# Patient Record
Sex: MALE | Race: WHITE | Employment: FULL TIME | URBAN - METROPOLITAN AREA
[De-identification: names, ages, dates, MRNs, and addresses within clinical notes are randomized per-mention and may not be internally consistent; named-entity substitution may affect disease eponyms.]

---

## 2019-08-17 ENCOUNTER — HOSPITAL ENCOUNTER (OUTPATIENT)
Age: 54
Discharge: HOME | End: 2019-08-17
Attending: EMERGENCY MEDICINE
Payer: COMMERCIAL

## 2019-08-17 VITALS
OXYGEN SATURATION: 100 % | SYSTOLIC BLOOD PRESSURE: 116 MMHG | DIASTOLIC BLOOD PRESSURE: 74 MMHG | HEIGHT: 70 IN | BODY MASS INDEX: 26.48 KG/M2 | WEIGHT: 185 LBS | HEART RATE: 82 BPM | RESPIRATION RATE: 16 BRPM | TEMPERATURE: 98 F

## 2019-08-17 DIAGNOSIS — M25.561 RIGHT KNEE PAIN, UNSPECIFIED CHRONICITY: Primary | ICD-10-CM

## 2019-08-17 PROCEDURE — 99202 OFFICE O/P NEW SF 15 MIN: CPT

## 2019-08-17 RX ORDER — TAMSULOSIN HYDROCHLORIDE 0.4 MG/1
CAPSULE ORAL
COMMUNITY
Start: 2019-08-01

## 2019-08-17 NOTE — ED INITIAL ASSESSMENT (HPI)
Pt states last Thursday twisted right knee when play golf. Pt states felt a little sore but nothing too concerning to him. Four days later went running and since then has had right knee pain.  Pt states having pain when walking but mostly when switching dir

## 2019-08-17 NOTE — ED PROVIDER NOTES
Patient Seen in: [address]    History   Patient presents with:  Lower Extremity Injury (musculoskeletal)    Stated Complaint: RT KNEE PAIN    HPI    26-year-old male patient presents complaining of right-sided knee pain motion of the knee. No ligamentous laxity noted. No pain to the meniscus with compression and rotation. No crepitus noted.   Neuro: Normal speech, nonfocal examination  Psych: Appropriate, no agitation    ED Course   Labs Reviewed - No data to display

## 2019-08-17 NOTE — ED NOTES
Pt discharged to care of self. Pt assessed by MD. After care discussed, all questions answered. Pt confirmed understanding.

## 2021-08-06 ENCOUNTER — HOSPITAL ENCOUNTER (OUTPATIENT)
Age: 56
Discharge: HOME | End: 2021-08-06
Payer: COMMERCIAL

## 2021-08-06 ENCOUNTER — APPOINTMENT (OUTPATIENT)
Dept: GENERAL RADIOLOGY | Age: 56
End: 2021-08-06
Attending: NURSE PRACTITIONER
Payer: COMMERCIAL

## 2021-08-06 VITALS
RESPIRATION RATE: 18 BRPM | WEIGHT: 180 LBS | HEIGHT: 74 IN | HEART RATE: 76 BPM | OXYGEN SATURATION: 100 % | SYSTOLIC BLOOD PRESSURE: 139 MMHG | TEMPERATURE: 98 F | BODY MASS INDEX: 23.1 KG/M2 | DIASTOLIC BLOOD PRESSURE: 77 MMHG

## 2021-08-06 DIAGNOSIS — S46.912A SHOULDER STRAIN, LEFT, INITIAL ENCOUNTER: Primary | ICD-10-CM

## 2021-08-06 DIAGNOSIS — T07.XXXA ABRASIONS OF MULTIPLE SITES: ICD-10-CM

## 2021-08-06 PROCEDURE — 99203 OFFICE O/P NEW LOW 30 MIN: CPT | Performed by: NURSE PRACTITIONER

## 2021-08-06 PROCEDURE — 73030 X-RAY EXAM OF SHOULDER: CPT | Performed by: NURSE PRACTITIONER

## 2021-08-06 NOTE — ED INITIAL ASSESSMENT (HPI)
Per pt having left shoulder pain from fall while playing tennis, also concern for redness from abrasion after same fall.

## 2021-08-06 NOTE — ED PROVIDER NOTES
Patient Seen in: [facility]      History   Patient presents with:  Shoulder Pain    Stated Complaint: shoulder injured wants X-ray    HPI/Subjective:   Well-appearing 81-year-old male presents with complaints of left shoulder pain after fall muscle use or tachypnea. Abdomen: Soft, nontender, non-distended. Back: Normal inspection, no tenderness. Extremities: No focal swelling. Capillary refill noted. The patient's motor strength is 5/5 and symmetric in upper extremities bilaterally. Jesse Ferguson MD on 8/06/2021 at 10:29 AM           MDM   I reviewed the patient's available past medical records in [facility]. I independently reviewed the patient's left shoulder x-ray. Left shoulder shoulder x-ray is negative for acute fracture or dislocation.

## 2021-08-09 ENCOUNTER — HOSPITAL ENCOUNTER (OUTPATIENT)
Age: 56
Discharge: HOME | End: 2021-08-09
Payer: COMMERCIAL

## 2021-08-09 VITALS
OXYGEN SATURATION: 100 % | SYSTOLIC BLOOD PRESSURE: 130 MMHG | RESPIRATION RATE: 16 BRPM | HEART RATE: 69 BPM | TEMPERATURE: 97 F | DIASTOLIC BLOOD PRESSURE: 72 MMHG

## 2021-08-09 DIAGNOSIS — S61.001D AVULSION OF SKIN OF RIGHT THUMB, SUBSEQUENT ENCOUNTER: Primary | ICD-10-CM

## 2021-08-09 PROCEDURE — 99214 OFFICE O/P EST MOD 30 MIN: CPT | Performed by: NURSE PRACTITIONER

## 2021-08-09 RX ORDER — CEPHALEXIN 500 MG/1
500 CAPSULE ORAL 3 TIMES DAILY
Qty: 15 CAPSULE | Refills: 0 | Status: SHIPPED | OUTPATIENT
Start: 2021-08-09 | End: 2021-08-14
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
## 2021-08-09 NOTE — ED PROVIDER NOTES
Patient Seen in: [facility]      History   Patient presents with:  Wound Care: I have an abrasion on my thumb that does not appear to be healing well.  - Entered by patient    Stated Complaint: Wound Care - I have an abrasion on my thumb that normocephalic, atraumatic. No conjunctival injection. No facial droop or slurred speech. No oral lesions or pallor. Mucous membranes moist.    Neck: Supple. Normal ROM. Lungs: Good inspiratory effort. No accessory muscle use or tachypnea.     Abdomen: No days., Normal, Disp-15 capsule, R-0

## 2021-09-06 ENCOUNTER — HOSPITAL ENCOUNTER (OUTPATIENT)
Age: 56
Discharge: HOME | End: 2021-09-06
Payer: COMMERCIAL

## 2021-09-06 ENCOUNTER — APPOINTMENT (OUTPATIENT)
Dept: GENERAL RADIOLOGY | Age: 56
End: 2021-09-06
Attending: NURSE PRACTITIONER
Payer: COMMERCIAL

## 2021-09-06 VITALS
RESPIRATION RATE: 17 BRPM | SYSTOLIC BLOOD PRESSURE: 109 MMHG | DIASTOLIC BLOOD PRESSURE: 63 MMHG | OXYGEN SATURATION: 99 % | TEMPERATURE: 98 F | HEART RATE: 54 BPM

## 2021-09-06 DIAGNOSIS — R22.41 LOCALIZED SWELLING OF RIGHT FOOT: Primary | ICD-10-CM

## 2021-09-06 PROCEDURE — 73630 X-RAY EXAM OF FOOT: CPT | Performed by: NURSE PRACTITIONER

## 2021-09-06 PROCEDURE — 99213 OFFICE O/P EST LOW 20 MIN: CPT | Performed by: NURSE PRACTITIONER

## 2021-09-06 NOTE — ED PROVIDER NOTES
Patient Seen in: [facility]      History   Patient presents with:   Foot Pain: Foot injury - Entered by patient    Stated Complaint: Leg or Foot Injury - Foot injury    HPI/Subjective:   Well-appearing 52-year-old male presents with complaint moist.     Neck:  Supple. Normal ROM. Lungs:  Good inspiratory effort. No accessory muscle use or tachypnea. Abdomen: Soft, nontender, non-distended. Back: Normal inspection, no tenderness. Extremities: No focal swelling or tenderness.  Lillia Carlos x-ray is negative for acute fracture or dislocation. Second metatarsal appears slightly flatten. No visible soft tissue swelling. I reviewed the x-ray findings with patient.   I strongly encouraged patient to follow-up with his orthopedic surgeon as he i

## 2022-04-22 ENCOUNTER — HOSPITAL ENCOUNTER (OUTPATIENT)
Age: 57
Discharge: HOME | End: 2022-04-22
Payer: COMMERCIAL

## 2022-04-22 VITALS
DIASTOLIC BLOOD PRESSURE: 75 MMHG | SYSTOLIC BLOOD PRESSURE: 138 MMHG | HEART RATE: 88 BPM | OXYGEN SATURATION: 100 % | TEMPERATURE: 98 F | RESPIRATION RATE: 20 BRPM

## 2022-04-22 DIAGNOSIS — B02.9 HERPES ZOSTER WITHOUT COMPLICATION: Primary | ICD-10-CM

## 2022-04-22 RX ORDER — VALACYCLOVIR HYDROCHLORIDE 1 G/1
1000 TABLET, FILM COATED ORAL 3 TIMES DAILY
Qty: 21 TABLET | Refills: 0 | Status: SHIPPED | OUTPATIENT
Start: 2022-04-22 | End: 2022-04-29

## 2022-04-22 NOTE — ED INITIAL ASSESSMENT (HPI)
Pt states 2 days ago noticed some discomfort under right arm but didn't think much of it, pt states today noticed a burning rash under his right arm. Pt denies fever or NVD. Pt states received 2nd covid booster on Tuesday.

## 2022-06-29 ENCOUNTER — OFFICE VISIT (OUTPATIENT)
Dept: RHEUMATOLOGY | Facility: CLINIC | Age: 57
End: 2022-06-29
Payer: COMMERCIAL

## 2022-06-29 ENCOUNTER — LAB ENCOUNTER (OUTPATIENT)
Dept: LAB | Age: 57
End: 2022-06-29
Attending: INTERNAL MEDICINE
Payer: COMMERCIAL

## 2022-06-29 VITALS
HEIGHT: 73 IN | WEIGHT: 175 LBS | HEART RATE: 74 BPM | SYSTOLIC BLOOD PRESSURE: 132 MMHG | DIASTOLIC BLOOD PRESSURE: 79 MMHG | BODY MASS INDEX: 23.19 KG/M2

## 2022-06-29 DIAGNOSIS — Z51.81 THERAPEUTIC DRUG MONITORING: ICD-10-CM

## 2022-06-29 DIAGNOSIS — M06.09 RHEUMATOID ARTHRITIS OF MULTIPLE SITES WITH NEGATIVE RHEUMATOID FACTOR (HCC): Primary | ICD-10-CM

## 2022-06-29 DIAGNOSIS — M06.09 RHEUMATOID ARTHRITIS OF MULTIPLE SITES WITH NEGATIVE RHEUMATOID FACTOR (HCC): ICD-10-CM

## 2022-06-29 LAB
ALBUMIN SERPL-MCNC: 3.6 G/DL (ref 3.4–5)
AST SERPL-CCNC: 15 U/L (ref 15–37)
BASOPHILS # BLD AUTO: 0.05 X10(3) UL (ref 0–0.2)
BASOPHILS NFR BLD AUTO: 0.8 %
CREAT BLD-MCNC: 1.03 MG/DL
CRP SERPL-MCNC: <0.29 MG/DL (ref ?–0.3)
DEPRECATED RDW RBC AUTO: 46.6 FL (ref 35.1–46.3)
EOSINOPHIL # BLD AUTO: 0.04 X10(3) UL (ref 0–0.7)
EOSINOPHIL NFR BLD AUTO: 0.6 %
ERYTHROCYTE [DISTWIDTH] IN BLOOD BY AUTOMATED COUNT: 13.2 % (ref 11–15)
ERYTHROCYTE [SEDIMENTATION RATE] IN BLOOD: 16 MM/HR
HCT VFR BLD AUTO: 44.2 %
HGB BLD-MCNC: 14.6 G/DL
IMM GRANULOCYTES # BLD AUTO: 0.04 X10(3) UL (ref 0–1)
IMM GRANULOCYTES NFR BLD: 0.6 %
LYMPHOCYTES # BLD AUTO: 1.33 X10(3) UL (ref 1–4)
LYMPHOCYTES NFR BLD AUTO: 20.5 %
MCH RBC QN AUTO: 31.5 PG (ref 26–34)
MCHC RBC AUTO-ENTMCNC: 33 G/DL (ref 31–37)
MCV RBC AUTO: 95.5 FL
MONOCYTES # BLD AUTO: 0.32 X10(3) UL (ref 0.1–1)
MONOCYTES NFR BLD AUTO: 4.9 %
NEUTROPHILS # BLD AUTO: 4.71 X10 (3) UL (ref 1.5–7.7)
NEUTROPHILS # BLD AUTO: 4.71 X10(3) UL (ref 1.5–7.7)
NEUTROPHILS NFR BLD AUTO: 72.6 %
PLATELET # BLD AUTO: 281 10(3)UL (ref 150–450)
RBC # BLD AUTO: 4.63 X10(6)UL
WBC # BLD AUTO: 6.5 X10(3) UL (ref 4–11)

## 2022-06-29 PROCEDURE — 3075F SYST BP GE 130 - 139MM HG: CPT | Performed by: INTERNAL MEDICINE

## 2022-06-29 PROCEDURE — 84450 TRANSFERASE (AST) (SGOT): CPT

## 2022-06-29 PROCEDURE — 82040 ASSAY OF SERUM ALBUMIN: CPT

## 2022-06-29 PROCEDURE — 86140 C-REACTIVE PROTEIN: CPT

## 2022-06-29 PROCEDURE — 36415 COLL VENOUS BLD VENIPUNCTURE: CPT

## 2022-06-29 PROCEDURE — 99204 OFFICE O/P NEW MOD 45 MIN: CPT | Performed by: INTERNAL MEDICINE

## 2022-06-29 PROCEDURE — 3078F DIAST BP <80 MM HG: CPT | Performed by: INTERNAL MEDICINE

## 2022-06-29 PROCEDURE — 3008F BODY MASS INDEX DOCD: CPT | Performed by: INTERNAL MEDICINE

## 2022-06-29 PROCEDURE — 82565 ASSAY OF CREATININE: CPT

## 2022-06-29 PROCEDURE — 85025 COMPLETE CBC W/AUTO DIFF WBC: CPT

## 2022-06-29 PROCEDURE — 85652 RBC SED RATE AUTOMATED: CPT

## 2022-06-29 RX ORDER — ASPIRIN 81 MG/1
81 TABLET ORAL DAILY
COMMUNITY

## 2022-06-29 RX ORDER — PREDNISONE 1 MG/1
TABLET ORAL
Qty: 100 TABLET | Refills: 1 | Status: SHIPPED | OUTPATIENT
Start: 2022-06-29

## 2022-06-29 RX ORDER — FOLIC ACID 1 MG/1
TABLET ORAL
Qty: 90 TABLET | Refills: 3 | Status: SHIPPED | OUTPATIENT
Start: 2022-06-29

## 2022-06-29 RX ORDER — METHOTREXATE 2.5 MG/1
TABLET ORAL
Qty: 20 TABLET | Refills: 2 | Status: SHIPPED | OUTPATIENT
Start: 2022-06-29

## 2022-06-29 NOTE — PROGRESS NOTES
Dear Dr. Odilia Sanchez:    I saw your patient Mariela Contreras in consultation this afternoon at your request, regarding rheumatoid arthritis. As you know, he is a 60-year-old gentleman who 1 year ago developed pain in his right foot second MTP joint. He saw an orthopedist, and an x-ray was abnormal.  He got different shoes and  inserts. The pain and swelling went across from his second through fourth MTP joints. January of 2022, without injury, his right ankle became swollen medially and laterally, with soreness and stiffness. He saw an orthopedist who thought it was tendinitis. He worked with physical therapy for 6 weeks. He was in a boot for 4 weeks, and is now has a brace. Just a month ago, his left fifth MTP joint became swollen, stiff, and inflamed. Occasionally his thumb joints can hurt after working. Labs were done March 31st, 2022. TSH, urinalysis, B12, folate, CMP, CBC, C-reactive protein, LDH, HIV, HCV, hepatitis B surface antigen were negative. Sed rate was borderline at 22. On June 8th, 2022, CCP antibody was high at 149 (less than 4.9), and rheumatoid factor was negative. An MRI was done of his right foot May of 2022, showing new third and fourth MTP joint effusions with suspected erosion of his third and fourth metatarsal heads. Marked peroneus longus tenosynovitis and marked flexor pollicis longus tenosynovitis. Also tear of his peroneus. He sleeps fine, and his energy is fine. When he gets out of bed in the morning, his feet are not more swollen, and if anything are better. His first steps are very painful. He takes about 6 over-the-counter ibuprofen a day, which help. Past medical history:  He has BPH and is doing much better on Flomax. He fell in the past.  His CT T was done to rule out fracture, and several dots were found on his brain. 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 It was repeated later and had not changed. He has not had symptoms. He fractured his right ankle and foot in July 2014. He uses marijuana. He had a DVT after his right ankle and foot fracture. He had circumcision. Family history:  Father with coronary disease. He does not know of any autoimmune disease. Social history:  He was . 2 children. He is a manager. No cigarettes. Alcohol several times a week. Coffee every morning. He has not been able to run like he used to but is still working out [employer]. Review of systems:  No fevers, chills. Occasional night sweats. No anorexia. He did lose from 185 to 169 pounds which was felt to be from stress. He is gone back up to 175 pounds. No rash, history of psoriasis. Usual hair thinning. No internal eye inflammation, oral or nasal ulcers, of adenopathy. He rarely can get short of breath. No chest pain. No acid reflux, stomach pain, nausea or vomiting, constipation or diarrhea, blood in his stools. No trouble urinating on medicine. He can have dry mouth at night. He has water at his bedside. Occasional dry eye. No Raynaud's or headache. Physical exam:  Pleasant gentleman in no acute distress. Blood pressure 132/79, pulse 74, height 6' 1\" (1.854 m), weight 175 lb (79.4 kg). No rash. No abnormal hair thinning. No conjunctival injection. No nasal oral lesions. No cervical adenopathy. Lungs clear. S1 and S2 regular. Abdomen without hepatosplenomegaly or tenderness. Normal bowel sounds. No lower extremity edema. Cervical and lumbar spine motion is good. Shoulders, elbows, wrists, and hands are unremarkable without synovitis.  strength is excellent. Hips and knees move normally. There is inflammation about his right ankle with limited and painful motion. The balls of his right foot is tender to squeeze. His left fifth MTP joint is tender to squeeze. Assessment and plan:    1. CCP positive rheumatoid arthritis.   The CCP antibody is very specific for rheumatoid arthritis. He already has erosions across the ball of his right foot. He has significant tenosynovitis in this right ankle. I gave him the up-to-date articles on rheumatoid arthritis and methotrexate. RA and its treatments were discussed. For immediate relief, he will start prednisone at 30 mg a day, and taper to and stay on 5 mg a day. He will be started on methotrexate as a disease modifying drug, at 10 mg weekly, with folic acid 1 mg a day (which reduces the risk of toxicity). The possible side effects were discussed. He will avoid alcohol. Labs will be done today, including inflammation markers. He will schedule follow-up in 6 weeks, and monitoring and inflammation labs will be done beforehand. 2.  History of right ankle fracture in 2014. 3.  Tear of his peroneus tendon in his right ankle. He is hoping that he does not require surgery. Thank you for the opportunity to participate in his care. Sincerely,      Enrico Andersen MD   Rheumatology.

## 2022-06-30 ENCOUNTER — PATIENT MESSAGE (OUTPATIENT)
Dept: RHEUMATOLOGY | Facility: CLINIC | Age: 57
End: 2022-06-30

## 2022-06-30 NOTE — TELEPHONE ENCOUNTER
From: Eleazar Sibley  Sent: 6/30/2022 1:08 PM CDT  To: Anias Ribeiro Clinical Staff  Subject: Lab results. Thank you for the update, Dr. Jama Abel. Yesterday I did not start the prednisone, because I got home late and didn't want to take them so close to bed time. So I started this morning with 6 pills. Your instructions were to start yesterday with 6 pills, then 6 more today, then 5, 4, 3, 2, 1, etc. My question is, should I take 6 again tomorrow morning, or just start stepping down to 5?     Thanks,  Crayton Callander

## 2022-07-21 RX ORDER — METHOTREXATE 2.5 MG/1
TABLET ORAL
Qty: 20 TABLET | Refills: 2 | Status: SHIPPED | OUTPATIENT
Start: 2022-07-21

## 2022-07-21 NOTE — TELEPHONE ENCOUNTER
Last filled: 6/29/22 #20 tab with 2 refills   LOV: 6/29/22  Future Appointments   Date Time Provider Nakia Govea   8/12/2022  9:20 AM Janelle Gupta MD [facility]     Labs:   Component      Latest Ref Rng & Units 6/29/2022   WBC      4.0 - 11.0 x10(3) uL 6.5   RBC      4.30 - 5.70 x10(6)uL 4.63   Hemoglobin      13.0 - 17.5 g/dL 14.6   Hematocrit      39.0 - 53.0 % 44.2   MCV      80.0 - 100.0 fL 95.5   MCH      26.0 - 34.0 pg 31.5   MCHC      31.0 - 37.0 g/dL 33.0   RDW-SD      35.1 - 46.3 fL 46.6 (H)   RDW      11.0 - 15.0 % 13.2   Platelet Count      746.0 - 450.0 10(3)uL 281.0   Prelim Neutrophil Abs      1.50 - 7.70 x10 (3) uL 4.71   Neutrophils Absolute      1.50 - 7.70 x10(3) uL 4.71   Lymphocytes Absolute      1.00 - 4.00 x10(3) uL 1.33   Monocytes Absolute      0.10 - 1.00 x10(3) uL 0.32   Eosinophils Absolute      0.00 - 0.70 x10(3) uL 0.04   Basophils Absolute      0.00 - 0.20 x10(3) uL 0.05   Immature Granulocyte Absolute      0.00 - 1.00 x10(3) uL 0.04   Neutrophils %      % 72.6   Lymphocytes %      % 20.5   Monocytes %      % 4.9   Eosinophils %      % 0.6   Basophils %      % 0.8   Immature Granulocyte %      % 0.6   CREATININE      0.70 - 1.30 mg/dL 1.03   eGFR NON-AFR.  AMERICAN      >=60 81   eGFR       >=60 94   SED RATE      0 - 20 mm/Hr 16   C-REACTIVE PROTEIN      <0.30 mg/dL <0.29   AST (SGOT)      15 - 37 U/L 15   Albumin      3.4 - 5.0 g/dL 3.6

## 2022-08-16 ENCOUNTER — OFFICE VISIT (OUTPATIENT)
Dept: RHEUMATOLOGY | Facility: CLINIC | Age: 57
End: 2022-08-16
Payer: COMMERCIAL

## 2022-08-16 ENCOUNTER — LAB ENCOUNTER (OUTPATIENT)
Dept: LAB | Age: 57
End: 2022-08-16
Attending: INTERNAL MEDICINE
Payer: COMMERCIAL

## 2022-08-16 VITALS
HEART RATE: 80 BPM | SYSTOLIC BLOOD PRESSURE: 131 MMHG | HEIGHT: 73 IN | BODY MASS INDEX: 23.06 KG/M2 | WEIGHT: 174 LBS | DIASTOLIC BLOOD PRESSURE: 72 MMHG

## 2022-08-16 DIAGNOSIS — M06.09 RHEUMATOID ARTHRITIS OF MULTIPLE SITES WITH NEGATIVE RHEUMATOID FACTOR (HCC): Primary | ICD-10-CM

## 2022-08-16 DIAGNOSIS — Z51.81 THERAPEUTIC DRUG MONITORING: ICD-10-CM

## 2022-08-16 DIAGNOSIS — M06.09 RHEUMATOID ARTHRITIS OF MULTIPLE SITES WITH NEGATIVE RHEUMATOID FACTOR (HCC): ICD-10-CM

## 2022-08-16 LAB
ALBUMIN SERPL-MCNC: 3.3 G/DL (ref 3.4–5)
AST SERPL-CCNC: 11 U/L (ref 15–37)
BASOPHILS # BLD AUTO: 0.04 X10(3) UL (ref 0–0.2)
BASOPHILS NFR BLD AUTO: 0.5 %
CREAT BLD-MCNC: 1.07 MG/DL
CRP SERPL-MCNC: 0.67 MG/DL (ref ?–0.3)
DEPRECATED RDW RBC AUTO: 50 FL (ref 35.1–46.3)
EOSINOPHIL # BLD AUTO: 0.02 X10(3) UL (ref 0–0.7)
EOSINOPHIL NFR BLD AUTO: 0.3 %
ERYTHROCYTE [DISTWIDTH] IN BLOOD BY AUTOMATED COUNT: 14 % (ref 11–15)
ERYTHROCYTE [SEDIMENTATION RATE] IN BLOOD: 12 MM/HR
GFR SERPLBLD BASED ON 1.73 SQ M-ARVRAT: 82 ML/MIN/1.73M2 (ref 60–?)
HCT VFR BLD AUTO: 41.4 %
HGB BLD-MCNC: 13.7 G/DL
IMM GRANULOCYTES # BLD AUTO: 0.02 X10(3) UL (ref 0–1)
IMM GRANULOCYTES NFR BLD: 0.3 %
LYMPHOCYTES # BLD AUTO: 1.2 X10(3) UL (ref 1–4)
LYMPHOCYTES NFR BLD AUTO: 15.8 %
MCH RBC QN AUTO: 32.1 PG (ref 26–34)
MCHC RBC AUTO-ENTMCNC: 33.1 G/DL (ref 31–37)
MCV RBC AUTO: 97 FL
MONOCYTES # BLD AUTO: 0.38 X10(3) UL (ref 0.1–1)
MONOCYTES NFR BLD AUTO: 5 %
NEUTROPHILS # BLD AUTO: 5.92 X10 (3) UL (ref 1.5–7.7)
NEUTROPHILS # BLD AUTO: 5.92 X10(3) UL (ref 1.5–7.7)
NEUTROPHILS NFR BLD AUTO: 78.1 %
PLATELET # BLD AUTO: 251 10(3)UL (ref 150–450)
RBC # BLD AUTO: 4.27 X10(6)UL
WBC # BLD AUTO: 7.6 X10(3) UL (ref 4–11)

## 2022-08-16 PROCEDURE — 3008F BODY MASS INDEX DOCD: CPT | Performed by: INTERNAL MEDICINE

## 2022-08-16 PROCEDURE — 84450 TRANSFERASE (AST) (SGOT): CPT

## 2022-08-16 PROCEDURE — 85652 RBC SED RATE AUTOMATED: CPT

## 2022-08-16 PROCEDURE — 86140 C-REACTIVE PROTEIN: CPT

## 2022-08-16 PROCEDURE — 3078F DIAST BP <80 MM HG: CPT | Performed by: INTERNAL MEDICINE

## 2022-08-16 PROCEDURE — 3075F SYST BP GE 130 - 139MM HG: CPT | Performed by: INTERNAL MEDICINE

## 2022-08-16 PROCEDURE — 82565 ASSAY OF CREATININE: CPT

## 2022-08-16 PROCEDURE — 85025 COMPLETE CBC W/AUTO DIFF WBC: CPT

## 2022-08-16 PROCEDURE — 82040 ASSAY OF SERUM ALBUMIN: CPT

## 2022-08-16 PROCEDURE — 36415 COLL VENOUS BLD VENIPUNCTURE: CPT

## 2022-08-16 PROCEDURE — 99214 OFFICE O/P EST MOD 30 MIN: CPT | Performed by: INTERNAL MEDICINE

## 2022-08-16 RX ORDER — DESONIDE 0.5 MG/G
CREAM TOPICAL AS NEEDED
COMMUNITY
Start: 2022-03-31

## 2022-08-16 RX ORDER — METHOTREXATE 2.5 MG/1
TABLET ORAL
Qty: 30 TABLET | Refills: 2 | Status: SHIPPED | OUTPATIENT
Start: 2022-08-16

## 2022-09-12 ENCOUNTER — PATIENT MESSAGE (OUTPATIENT)
Dept: RHEUMATOLOGY | Facility: CLINIC | Age: 57
End: 2022-09-12

## 2022-09-12 NOTE — TELEPHONE ENCOUNTER
From: Ge Martinez  To: Doris Long MD  Sent: 9/12/2022 8:42 AM CDT  Subject: Upcoming vaccines    Farheen, Dr. Lloyd Oneill,    I need to schedule the following vaccines, and I would like your advice on managing them with my Methotrexate, which I take every Wednesday at dinner time:    - Shingles - The six-month window to get my second shot closes in October, so this needs to get done soon  - Covid booster - I'd like to get the Omincron variant booster as soon as possible  - Flu vaccine - I typically do this every year in October, but am ready anytime    How would you recommend proceeding?     Thanks,  Talon Wolf

## 2022-10-03 NOTE — PROGRESS NOTES
Koko Galvan is a 60-year-old gentleman who I first met in consultation for Dr. Geovanna Billingsley on June 29th of 2022, with CCP positive rheumatoid arthritis. His CCP antibody was 149 (less than 4.9) June of 2022. His rheumatoid factor is negative. MRI of his right foot May of 2022 showed new third and fourth MTP joint effusions with suspected erosions of his third and fourth metatarsal heads, marked peroneus longus tenosynovitis, and marked flexor pollicis longus tenosynovitis. Also tear of his peroneus. He was started on prednisone, and remains on 5 mg in the morning. It helped immediately. He has been up to 15 mg of methotrexate weekly, since his last visit August 16th of 2022. He may be noticing some fatigue and stomach upset the day after taking it each week. He definitely continues to improve. The ball of his right foot remains swollen and stiff, and he is noticing deformity of his right fourth toe. Digna Fields He continues to have swelling and pain of his left fifth MTP joint, and the bunionette deformity is worsening. Digna Fields He has new custom inserts for his shoes from his podiatrist.  His right lateral ankle remains swollen. His last labs were August 16, 2022. CBC, creatinine, and AST were normal.  Albumin was low at 3.3.  C-reactive protein remains elevated at 0.67. Sed rate normal at 12. He denies new medical or surgical problems. No infections. Review of Systems:   Constitutional: Negative for fatigue. Respiratory: Negative for shortness of breath. Cardiovascular: Negative for chest pain. Gastrointestinal: Negative for abdominal pain. Skin: Negative for rash. Hematological: Negative for adenopathy. Allergies:No Known Allergies    Physical Exam:  HENT:      Head: Normocephalic. Eyes:      Pupils: Pupils are equal, round, and reactive to light. Cardiovascular:      Rate and Rhythm: Normal rate and regular rhythm. Pulses: Normal pulses. Heart sounds: Normal heart sounds. Pulmonary:      Effort: Pulmonary effort is normal.      Breath sounds: Normal breath sounds. Abdominal:      General: Abdomen is flat. Bowel sounds are normal.   Musculoskeletal:      Comments: The balls of his feet are tender to squeeze. There is soft tissue swelling of of his right lateral ankle. Left bunionette deformity and tender MTP to squeeze. Deformity right fourth toe with tenderness of his right MTP joints to squeeze. Skin:     Findings: No rash. Neurological:      Mental Status: He is alert and oriented to person, place, and time. Assessment & Plan:     1. CCP positive rheumatoid arthritis. The CCP antibody is very specific for rheumatoid arthritis. He has erosions across the ball of his right foot. He has significant tenosynovitis in this right ankle. The inflammation has improved on prednisone. He seems to be tolerating methotrexate well. We will try to taper his prednisone to half by 1 mg every week. Methotrexate monitoring labs and inflammation markers today. He will increase his methotrexate to 20 mg weekly. He will schedule follow-up in 2 months. 2.  History of right ankle fracture in 2014. 3.  Tear of his peroneus tendon in his right ankle. He is hoping that he does not require surgery.

## 2022-10-07 ENCOUNTER — LAB ENCOUNTER (OUTPATIENT)
Dept: LAB | Age: 57
End: 2022-10-07
Attending: INTERNAL MEDICINE
Payer: COMMERCIAL

## 2022-10-07 ENCOUNTER — OFFICE VISIT (OUTPATIENT)
Dept: RHEUMATOLOGY | Facility: CLINIC | Age: 57
End: 2022-10-07
Payer: COMMERCIAL

## 2022-10-07 VITALS
DIASTOLIC BLOOD PRESSURE: 79 MMHG | HEIGHT: 73 IN | SYSTOLIC BLOOD PRESSURE: 128 MMHG | BODY MASS INDEX: 22.53 KG/M2 | HEART RATE: 68 BPM | WEIGHT: 170 LBS

## 2022-10-07 DIAGNOSIS — M06.09 RHEUMATOID ARTHRITIS OF MULTIPLE SITES WITH NEGATIVE RHEUMATOID FACTOR (HCC): ICD-10-CM

## 2022-10-07 DIAGNOSIS — M06.09 RHEUMATOID ARTHRITIS OF MULTIPLE SITES WITH NEGATIVE RHEUMATOID FACTOR (HCC): Primary | ICD-10-CM

## 2022-10-07 DIAGNOSIS — Z51.81 THERAPEUTIC DRUG MONITORING: ICD-10-CM

## 2022-10-07 LAB
ALBUMIN SERPL-MCNC: 3.5 G/DL (ref 3.4–5)
AST SERPL-CCNC: 23 U/L (ref 15–37)
BASOPHILS # BLD AUTO: 0.03 X10(3) UL (ref 0–0.2)
BASOPHILS NFR BLD AUTO: 0.3 %
CREAT BLD-MCNC: 1.06 MG/DL
CRP SERPL-MCNC: <0.29 MG/DL (ref ?–0.3)
DEPRECATED RDW RBC AUTO: 51 FL (ref 35.1–46.3)
EOSINOPHIL # BLD AUTO: 0.06 X10(3) UL (ref 0–0.7)
EOSINOPHIL NFR BLD AUTO: 0.6 %
ERYTHROCYTE [DISTWIDTH] IN BLOOD BY AUTOMATED COUNT: 14 % (ref 11–15)
ERYTHROCYTE [SEDIMENTATION RATE] IN BLOOD: 16 MM/HR
GFR SERPLBLD BASED ON 1.73 SQ M-ARVRAT: 83 ML/MIN/1.73M2 (ref 60–?)
HCT VFR BLD AUTO: 42.9 %
HGB BLD-MCNC: 14.2 G/DL
IMM GRANULOCYTES # BLD AUTO: 0.03 X10(3) UL (ref 0–1)
IMM GRANULOCYTES NFR BLD: 0.3 %
LYMPHOCYTES # BLD AUTO: 1.36 X10(3) UL (ref 1–4)
LYMPHOCYTES NFR BLD AUTO: 13.8 %
MCH RBC QN AUTO: 32.5 PG (ref 26–34)
MCHC RBC AUTO-ENTMCNC: 33.1 G/DL (ref 31–37)
MCV RBC AUTO: 98.2 FL
MONOCYTES # BLD AUTO: 0.5 X10(3) UL (ref 0.1–1)
MONOCYTES NFR BLD AUTO: 5.1 %
NEUTROPHILS # BLD AUTO: 7.87 X10 (3) UL (ref 1.5–7.7)
NEUTROPHILS # BLD AUTO: 7.87 X10(3) UL (ref 1.5–7.7)
NEUTROPHILS NFR BLD AUTO: 79.9 %
PLATELET # BLD AUTO: 244 10(3)UL (ref 150–450)
RBC # BLD AUTO: 4.37 X10(6)UL
WBC # BLD AUTO: 9.9 X10(3) UL (ref 4–11)

## 2022-10-07 PROCEDURE — 3078F DIAST BP <80 MM HG: CPT | Performed by: INTERNAL MEDICINE

## 2022-10-07 PROCEDURE — 85652 RBC SED RATE AUTOMATED: CPT

## 2022-10-07 PROCEDURE — 84450 TRANSFERASE (AST) (SGOT): CPT

## 2022-10-07 PROCEDURE — 36415 COLL VENOUS BLD VENIPUNCTURE: CPT

## 2022-10-07 PROCEDURE — 82565 ASSAY OF CREATININE: CPT

## 2022-10-07 PROCEDURE — 86140 C-REACTIVE PROTEIN: CPT

## 2022-10-07 PROCEDURE — 82040 ASSAY OF SERUM ALBUMIN: CPT

## 2022-10-07 PROCEDURE — 85025 COMPLETE CBC W/AUTO DIFF WBC: CPT

## 2022-10-07 PROCEDURE — 3074F SYST BP LT 130 MM HG: CPT | Performed by: INTERNAL MEDICINE

## 2022-10-07 PROCEDURE — 99214 OFFICE O/P EST MOD 30 MIN: CPT | Performed by: INTERNAL MEDICINE

## 2022-10-07 PROCEDURE — 3008F BODY MASS INDEX DOCD: CPT | Performed by: INTERNAL MEDICINE

## 2022-10-07 RX ORDER — METHOTREXATE 2.5 MG/1
TABLET ORAL
Qty: 104 TABLET | Refills: 1 | Status: SHIPPED | OUTPATIENT
Start: 2022-10-07

## 2022-10-07 RX ORDER — PREDNISONE 1 MG/1
TABLET ORAL
Qty: 70 TABLET | Refills: 0 | Status: SHIPPED | OUTPATIENT
Start: 2022-10-07

## 2022-10-12 ENCOUNTER — PATIENT MESSAGE (OUTPATIENT)
Dept: RHEUMATOLOGY | Facility: CLINIC | Age: 57
End: 2022-10-12

## 2022-10-12 NOTE — TELEPHONE ENCOUNTER
From: German Velasquez  To: Govind Cueto MD  Sent: 10/12/2022 12:13 PM CDT  Subject: methotrexate question    Hello,    I just tested positive for Covid. My question is, should I take my methotrexate tonight as scheduled (moving up to the eight pills), or should I hold off?     Thanks,  Vanda Daniel

## 2022-10-21 ENCOUNTER — TELEPHONE (OUTPATIENT)
Dept: RHEUMATOLOGY | Facility: CLINIC | Age: 57
End: 2022-10-21

## 2022-10-21 NOTE — TELEPHONE ENCOUNTER
I spoke to Damien. He held his methotrexate last week and this week, because he got COVID infection last week. He never got antibiotics. He is doing much better, and has residual cough. Yesterday his right foot was very painful. It is better today. He will start back the higher dose of methotrexate, at 20 mg weekly, in 3 days. He can take 10 mg of prednisone daily instead of 5 mg, if he needs it.

## 2022-10-21 NOTE — TELEPHONE ENCOUNTER
Regarding: methotrexate/covid follow-up  ----- Message from Gayla Cm RN sent at 10/21/2022  7:20 AM CDT -----       ----- Message from Barbara Doty to Monet Cline MD sent at 10/20/2022  7:34 AM -----   Samy Munoz. I'm definitely feeling pain in my right foot that hasn't been this bad since starting the methotrexate. I don't want to push it, but the RA symptoms are returning.      ----- Message -----       From:Soha HANKS       Sent:10/20/2022  7:28 AM CDT         To:Tarun Mejia    Subject:methotrexate/covid follow-up    Farheen Alvarado,    Your message has been sent to Dr. Kay Leon for review. He is currently out of the office today and returns tomorrow. Some of the providers have patients hold off for one more week if the have a lingering cough as long as your rheumatoid arthritis is doing okay. We don't want you to go into a flare up. Please let the office know if you have any additional questions. Gals to hear you are feeling better. Thank you  Carl Restrepo       ----- Message -----       From:Tarun Mejia       Sent:10/19/2022 11:11 AM CDT         To:Norris Gupta MD    Subject:methotrexate/covid follow-up    Hello,    You requested an update prior to restarting the methotrexate. I'm feeling much better. I do have a slight cough that has not completely cleared up, and a little nasal congestion. No fever, fatigue, etc.    Let me know if I can go back on the methotrexate or if you think I should hold off longer. Also happy to get on a call if you prefer.     Thanks,  Nicole Alvarado

## 2022-11-16 ENCOUNTER — TELEPHONE (OUTPATIENT)
Dept: RHEUMATOLOGY | Facility: CLINIC | Age: 57
End: 2022-11-16

## 2022-11-17 RX ORDER — PREDNISONE 1 MG/1
TABLET ORAL
Qty: 21 TABLET | Refills: 0 | Status: CANCELLED | OUTPATIENT
Start: 2022-11-17

## 2022-11-17 NOTE — TELEPHONE ENCOUNTER
Received request for 1 mg prednisone tablet refill. He is probably taking 5 to 10 mg of prednisone a day, so would not need 1 mg prescription refilled. Please call patient and ask how much prednisone he is taking. Thank you.

## 2022-11-17 NOTE — TELEPHONE ENCOUNTER
Sent in prescription for 2 mg in the morning for 1 week, 1 mg in the morning for 1 week, and then off.

## 2022-12-02 ENCOUNTER — LAB ENCOUNTER (OUTPATIENT)
Dept: LAB | Age: 57
End: 2022-12-02
Attending: INTERNAL MEDICINE
Payer: COMMERCIAL

## 2022-12-02 ENCOUNTER — OFFICE VISIT (OUTPATIENT)
Dept: RHEUMATOLOGY | Facility: CLINIC | Age: 57
End: 2022-12-02
Payer: COMMERCIAL

## 2022-12-02 VITALS
WEIGHT: 169 LBS | DIASTOLIC BLOOD PRESSURE: 78 MMHG | HEART RATE: 68 BPM | SYSTOLIC BLOOD PRESSURE: 133 MMHG | HEIGHT: 73 IN | BODY MASS INDEX: 22.4 KG/M2

## 2022-12-02 DIAGNOSIS — M06.09 RHEUMATOID ARTHRITIS OF MULTIPLE SITES WITH NEGATIVE RHEUMATOID FACTOR (HCC): Primary | ICD-10-CM

## 2022-12-02 DIAGNOSIS — Z51.81 THERAPEUTIC DRUG MONITORING: ICD-10-CM

## 2022-12-02 DIAGNOSIS — M06.09 RHEUMATOID ARTHRITIS OF MULTIPLE SITES WITH NEGATIVE RHEUMATOID FACTOR (HCC): ICD-10-CM

## 2022-12-02 LAB
ALBUMIN SERPL-MCNC: 3.3 G/DL (ref 3.4–5)
AST SERPL-CCNC: 10 U/L (ref 15–37)
BASOPHILS # BLD AUTO: 0.03 X10(3) UL (ref 0–0.2)
BASOPHILS NFR BLD AUTO: 0.4 %
CREAT BLD-MCNC: 1.04 MG/DL
CRP SERPL-MCNC: 1.27 MG/DL (ref ?–0.3)
DEPRECATED RDW RBC AUTO: 47.3 FL (ref 35.1–46.3)
EOSINOPHIL # BLD AUTO: 0.04 X10(3) UL (ref 0–0.7)
EOSINOPHIL NFR BLD AUTO: 0.5 %
ERYTHROCYTE [DISTWIDTH] IN BLOOD BY AUTOMATED COUNT: 13.3 % (ref 11–15)
ERYTHROCYTE [SEDIMENTATION RATE] IN BLOOD: 43 MM/HR
GFR SERPLBLD BASED ON 1.73 SQ M-ARVRAT: 84 ML/MIN/1.73M2 (ref 60–?)
HCT VFR BLD AUTO: 40.3 %
HGB BLD-MCNC: 13.5 G/DL
IMM GRANULOCYTES # BLD AUTO: 0.02 X10(3) UL (ref 0–1)
IMM GRANULOCYTES NFR BLD: 0.3 %
LYMPHOCYTES # BLD AUTO: 1.3 X10(3) UL (ref 1–4)
LYMPHOCYTES NFR BLD AUTO: 17.3 %
MCH RBC QN AUTO: 32.7 PG (ref 26–34)
MCHC RBC AUTO-ENTMCNC: 33.5 G/DL (ref 31–37)
MCV RBC AUTO: 97.6 FL
MONOCYTES # BLD AUTO: 0.45 X10(3) UL (ref 0.1–1)
MONOCYTES NFR BLD AUTO: 6 %
NEUTROPHILS # BLD AUTO: 5.68 X10 (3) UL (ref 1.5–7.7)
NEUTROPHILS # BLD AUTO: 5.68 X10(3) UL (ref 1.5–7.7)
NEUTROPHILS NFR BLD AUTO: 75.5 %
PLATELET # BLD AUTO: 287 10(3)UL (ref 150–450)
RBC # BLD AUTO: 4.13 X10(6)UL
WBC # BLD AUTO: 7.5 X10(3) UL (ref 4–11)

## 2022-12-02 PROCEDURE — 3078F DIAST BP <80 MM HG: CPT | Performed by: INTERNAL MEDICINE

## 2022-12-02 PROCEDURE — 36415 COLL VENOUS BLD VENIPUNCTURE: CPT

## 2022-12-02 PROCEDURE — 82565 ASSAY OF CREATININE: CPT

## 2022-12-02 PROCEDURE — 85025 COMPLETE CBC W/AUTO DIFF WBC: CPT

## 2022-12-02 PROCEDURE — 82040 ASSAY OF SERUM ALBUMIN: CPT

## 2022-12-02 PROCEDURE — 85652 RBC SED RATE AUTOMATED: CPT

## 2022-12-02 PROCEDURE — 3075F SYST BP GE 130 - 139MM HG: CPT | Performed by: INTERNAL MEDICINE

## 2022-12-02 PROCEDURE — 3008F BODY MASS INDEX DOCD: CPT | Performed by: INTERNAL MEDICINE

## 2022-12-02 PROCEDURE — 86140 C-REACTIVE PROTEIN: CPT

## 2022-12-02 PROCEDURE — 99214 OFFICE O/P EST MOD 30 MIN: CPT | Performed by: INTERNAL MEDICINE

## 2022-12-02 PROCEDURE — 84450 TRANSFERASE (AST) (SGOT): CPT

## 2022-12-02 RX ORDER — PREDNISONE 1 MG/1
TABLET ORAL
Qty: 100 TABLET | Refills: 1 | Status: SHIPPED | OUTPATIENT
Start: 2022-12-02

## 2022-12-12 ENCOUNTER — PATIENT MESSAGE (OUTPATIENT)
Dept: RHEUMATOLOGY | Facility: CLINIC | Age: 57
End: 2022-12-12

## 2022-12-12 NOTE — TELEPHONE ENCOUNTER
From: Eleazar Sibley  To: Eldridge Canavan, MD  Sent: 12/12/2022 9:08 AM CST  Subject: Prednisone    Hello,    [facility] said it never received the most recent prescription for prednisone (2 per day for one month, 1 per day for one month). Can you please resubmit or call the pharmacy? I'm leaving town today for a couple days and am down to my last pill.     Thanks,  Crayton Callander

## 2023-01-03 ENCOUNTER — PATIENT MESSAGE (OUTPATIENT)
Dept: RHEUMATOLOGY | Facility: CLINIC | Age: 58
End: 2023-01-03

## 2023-01-03 RX ORDER — PREDNISONE 1 MG/1
1 TABLET ORAL
Qty: 30 TABLET | Refills: 1 | Status: SHIPPED | OUTPATIENT
Start: 2023-01-03

## 2023-01-03 NOTE — TELEPHONE ENCOUNTER
Dr. oKfi Hart, patient needs refill of prednisone, he was to be on 2mg for 1 month, then 1mg for 1 month. He is just starting 1 month dose. Pended. LOV 12/2/22.

## 2023-01-03 NOTE — TELEPHONE ENCOUNTER
From: Bk Kumar  Sent: 1/3/2023 9:25 AM CST  To: Anais Ribeiro Clinical Staff  Subject: Prednisone    Hi, Kim. I finished up the two-per-day course and am now switching to once-per-day for the next month, per Dr. Kerry Strong instructions. I believe I will need a new prescription for the once-per-day. The bottle I have now says \"no refills. \" Can you please have that submitted to the [facility] [address] [facility] and [address]?     Thanks,  Shawna Vickers

## 2023-01-17 NOTE — PROGRESS NOTES
Aleks Eli is a 55-year-old gentleman who I first met in consultation for Dr. Rodrigue Fu on June 29th of 2022, with CCP positive rheumatoid arthritis. His CCP antibody was 149 (less than 4.9) June of 2022. His rheumatoid factor was negative. MRI of his right foot May of 2022 showed new third and fourth MTP joint effusions with suspected erosions of his third and fourth metatarsal heads, marked peroneus longus tenosynovitis, and marked flexor pollicis longus tenosynovitis. Also tear of his peroneus. He was started on prednisone, with relief. At his last visit October 7th of 2022, his labs were normal, including sed rate of 16 and C-reactive protein of less than 0.29. His methotrexate was increased to 20 mg weekly. He had a COVID infection, so was off for several weeks. He is tolerating the higher dose okay, without stomach upset or fatigue. He comes in off of prednisone. He still has a lot of swelling over his right lateral ankle, and the ball of his right foot is more swollen and stiff. He has new custom inserts for his shoes from his podiatrist.  He has not followed up recently with an orthopedist.    His last labs were October 7th of 2022. CBC, creatinine, albumin, and AST were normal.  Reactive protein and sed rate were normal.    He denies new medical or surgical problems. No infections. Review of Systems:   Constitutional: Negative for fatigue. Respiratory: Negative for shortness of breath. Cardiovascular: Negative for chest pain. Gastrointestinal: Negative for abdominal pain. Skin: Negative for rash. Hematological: Negative for adenopathy. Allergies:No Known Allergies    Physical Exam:  Pleasant gentleman in no acute distress. Blood pressure 133/78, pulse 68, height 6' 1\" (1.854 m), weight 169 lb (76.7 kg). HENT:      Head: Normocephalic. Eyes:      Pupils: Pupils are equal, round, and reactive to light.    Cardiovascular:      Rate and Rhythm: Normal rate and regular rhythm. Pulses: Normal pulses. Heart sounds: Normal heart sounds. Pulmonary:      Effort: Pulmonary effort is normal.      Breath sounds: Normal breath sounds. Abdominal:      General: Abdomen is flat. Bowel sounds are normal.   Musculoskeletal:      Comments: The balls of his feet are tender to squeeze. There is soft tissue swelling of of his right lateral ankle. Left bunionette deformity and tender MTP to squeeze. Deformity right fourth toe with tenderness of his right MTP joints to squeeze. Skin:     Findings: No rash. Neurological:      Mental Status: He is alert and oriented to person, place, and time. Assessment & Plan:     1. CCP positive rheumatoid arthritis. The CCP antibody is very specific for rheumatoid arthritis. He has erosions across the ball of his right foot. He has significant tenosynovitis in his right ankle, which hasn't gone away with time. He has been off prednisone, and he is having more pain and stiffness. Monitoring and inflammation labs today. He will remain on the maximal dose of methotrexate. He will take prednisone 2 mg for 1 month, 1 mg for 1 month, and then stop. If he again flares, he will restart low-dose prednisone. He will return in 3 months. If it still active at that point, he will need to start an anti-TNF injectable. 2.  History of right ankle fracture in 2014. 3.  Tear of his peroneus tendon in his right ankle. He is hoping that he does not require surgery. Skyrizi Counseling: I discussed with the patient the risks of risankizumab-rzaa including but not limited to immunosuppression, and serious infections.  The patient understands that monitoring is required including a PPD at baseline and must alert us or the primary physician if symptoms of infection or other concerning signs are noted.

## 2023-03-04 NOTE — PROGRESS NOTES
Kiah Berumen is a 50-year-old gentleman who I first met in consultation for Dr. Carmelita Calzada on June 29th of 2022, with CCP positive rheumatoid arthritis. His CCP antibody was 149 (less than 4.9) June of 2022. His rheumatoid factor was negative. MRI of his right foot May of 2022 showed new third and fourth MTP joint effusions with suspected erosions of his third and fourth metatarsal heads, marked peroneus longus tenosynovitis, and marked flexor pollicis longus tenosynovitis. Also tear of his peroneus. He was started on prednisone, with relief. Since his last visit 12/2/2022, he has continued methotrexate 20 mg weekly. He has totally tapered off prednisone. 12/2/2022 his sed rate was high at 43 and C-reactive protein 1.27. CBC, creatinine, AST normal.  Albumin, 3.3. His right lateral ankle continues to be inflamed. He still realizes he needs tendon repair. There can be discomfort across his MTP joints right more than left. He denies new medical or surgical problems. No infections. Review of Systems:   Constitutional: Negative for fatigue. Respiratory: Negative for shortness of breath. Cardiovascular: Negative for chest pain. Gastrointestinal: Negative for abdominal pain. Skin: Negative for rash. Hematological: Negative for adenopathy. Allergies:No Known Allergies    Physical Exam:  Pleasant gentleman in no acute distress. Blood pressure 133/77, pulse 70, height 6' 1\" (1.854 m), weight 171 lb (77.6 kg). HENT:      Head: Normocephalic. Eyes:      Pupils: Pupils are equal, round, and reactive to light. Cardiovascular:      Rate and Rhythm: Normal rate and regular rhythm. Pulses: Normal pulses. Heart sounds: Normal heart sounds. Pulmonary:      Effort: Pulmonary effort is normal.      Breath sounds: Normal breath sounds. Abdominal:      General: Abdomen is flat. Bowel sounds are normal.   Musculoskeletal:      Comments:  The balls of his feet are tender to squeeze. There is soft tissue swelling of his right lateral ankle. Left bunionette deformity and tender MTP to squeeze. Deformity right fourth toe with tenderness of his right MTP joints to squeeze. Skin:     Findings: No rash. Neurological:      Mental Status: He is alert and oriented to person, place, and time. Assessment & Plan:     1. CCP positive rheumatoid arthritis. The CCP antibody is very specific for rheumatoid arthritis. He has erosions across the ball of his right foot. He has significant tenosynovitis in his right ankle, which hasn't gone away with time. Labs today including inflammation markers and quantiferon TB Gold test.  He will be notified of his results. If his inflammation markers remain elevated on methotrexate alone, he should be on an anti-TNF agent. He will follow-up with his orthopedist about surgery. He tentatively will schedule back in 3 months. 2.  History of right ankle fracture in 2014. 3.  Tear of his peroneus tendon in his right ankle.

## 2023-03-10 ENCOUNTER — LAB ENCOUNTER (OUTPATIENT)
Dept: LAB | Age: 58
End: 2023-03-10
Attending: INTERNAL MEDICINE
Payer: COMMERCIAL

## 2023-03-10 ENCOUNTER — OFFICE VISIT (OUTPATIENT)
Dept: RHEUMATOLOGY | Facility: CLINIC | Age: 58
End: 2023-03-10

## 2023-03-10 VITALS
SYSTOLIC BLOOD PRESSURE: 133 MMHG | WEIGHT: 171 LBS | DIASTOLIC BLOOD PRESSURE: 77 MMHG | BODY MASS INDEX: 22.66 KG/M2 | HEART RATE: 70 BPM | HEIGHT: 73 IN

## 2023-03-10 DIAGNOSIS — M06.09 RHEUMATOID ARTHRITIS OF MULTIPLE SITES WITH NEGATIVE RHEUMATOID FACTOR (HCC): ICD-10-CM

## 2023-03-10 DIAGNOSIS — M06.09 RHEUMATOID ARTHRITIS OF MULTIPLE SITES WITH NEGATIVE RHEUMATOID FACTOR (HCC): Primary | ICD-10-CM

## 2023-03-10 DIAGNOSIS — Z51.81 THERAPEUTIC DRUG MONITORING: ICD-10-CM

## 2023-03-10 LAB
ALBUMIN SERPL-MCNC: 3.4 G/DL (ref 3.4–5)
AST SERPL-CCNC: 14 U/L (ref 15–37)
BASOPHILS # BLD AUTO: 0.03 X10(3) UL (ref 0–0.2)
BASOPHILS NFR BLD AUTO: 0.5 %
CREAT BLD-MCNC: 1.08 MG/DL
CRP SERPL-MCNC: 0.73 MG/DL (ref ?–0.3)
DEPRECATED RDW RBC AUTO: 51.2 FL (ref 35.1–46.3)
EOSINOPHIL # BLD AUTO: 0.03 X10(3) UL (ref 0–0.7)
EOSINOPHIL NFR BLD AUTO: 0.5 %
ERYTHROCYTE [DISTWIDTH] IN BLOOD BY AUTOMATED COUNT: 14.4 % (ref 11–15)
ERYTHROCYTE [SEDIMENTATION RATE] IN BLOOD: 20 MM/HR
GFR SERPLBLD BASED ON 1.73 SQ M-ARVRAT: 81 ML/MIN/1.73M2 (ref 60–?)
HCT VFR BLD AUTO: 41.8 %
HGB BLD-MCNC: 13.5 G/DL
IMM GRANULOCYTES # BLD AUTO: 0.02 X10(3) UL (ref 0–1)
IMM GRANULOCYTES NFR BLD: 0.4 %
LYMPHOCYTES # BLD AUTO: 1.18 X10(3) UL (ref 1–4)
LYMPHOCYTES NFR BLD AUTO: 21.1 %
MCH RBC QN AUTO: 31.5 PG (ref 26–34)
MCHC RBC AUTO-ENTMCNC: 32.3 G/DL (ref 31–37)
MCV RBC AUTO: 97.7 FL
MONOCYTES # BLD AUTO: 0.35 X10(3) UL (ref 0.1–1)
MONOCYTES NFR BLD AUTO: 6.3 %
NEUTROPHILS # BLD AUTO: 3.97 X10 (3) UL (ref 1.5–7.7)
NEUTROPHILS # BLD AUTO: 3.97 X10(3) UL (ref 1.5–7.7)
NEUTROPHILS NFR BLD AUTO: 71.2 %
PLATELET # BLD AUTO: 296 10(3)UL (ref 150–450)
RBC # BLD AUTO: 4.28 X10(6)UL
WBC # BLD AUTO: 5.6 X10(3) UL (ref 4–11)

## 2023-03-10 PROCEDURE — 3078F DIAST BP <80 MM HG: CPT | Performed by: INTERNAL MEDICINE

## 2023-03-10 PROCEDURE — 3008F BODY MASS INDEX DOCD: CPT | Performed by: INTERNAL MEDICINE

## 2023-03-10 PROCEDURE — 82040 ASSAY OF SERUM ALBUMIN: CPT

## 2023-03-10 PROCEDURE — 3075F SYST BP GE 130 - 139MM HG: CPT | Performed by: INTERNAL MEDICINE

## 2023-03-10 PROCEDURE — 82565 ASSAY OF CREATININE: CPT

## 2023-03-10 PROCEDURE — 86140 C-REACTIVE PROTEIN: CPT

## 2023-03-10 PROCEDURE — 99214 OFFICE O/P EST MOD 30 MIN: CPT | Performed by: INTERNAL MEDICINE

## 2023-03-10 PROCEDURE — 84450 TRANSFERASE (AST) (SGOT): CPT

## 2023-03-10 PROCEDURE — 85652 RBC SED RATE AUTOMATED: CPT

## 2023-03-10 PROCEDURE — 86480 TB TEST CELL IMMUN MEASURE: CPT | Performed by: INTERNAL MEDICINE

## 2023-03-10 PROCEDURE — 36415 COLL VENOUS BLD VENIPUNCTURE: CPT

## 2023-03-10 PROCEDURE — 85025 COMPLETE CBC W/AUTO DIFF WBC: CPT

## 2023-03-13 LAB
M TB IFN-G CD4+ T-CELLS BLD-ACNC: 0.05 IU/ML
M TB TUBERC IFN-G BLD QL: NEGATIVE
M TB TUBERC IGNF/MITOGEN IGNF CONTROL: 8.93 IU/ML
QFT TB1 AG MINUS NIL: 0 IU/ML
QFT TB2 AG MINUS NIL: 0 IU/ML

## 2023-05-03 RX ORDER — METHOTREXATE 2.5 MG/1
TABLET ORAL
Qty: 104 TABLET | Refills: 0 | Status: SHIPPED | OUTPATIENT
Start: 2023-05-03

## 2023-05-03 NOTE — TELEPHONE ENCOUNTER
Disp Refills Start End    methotrexate 2.5 MG Oral Tab 104 tablet 1 10/7/2022      LOV: 3/10/23  No future appointments.   Labs:   Component      Latest Ref Rng 3/10/2023   WBC      4.0 - 11.0 x10(3) uL 5.6    RBC      4.30 - 5.70 x10(6)uL 4.28 (L)    Hemoglobin      13.0 - 17.5 g/dL 13.5    Hematocrit      39.0 - 53.0 % 41.8    MCV      80.0 - 100.0 fL 97.7    MCH      26.0 - 34.0 pg 31.5    MCHC      31.0 - 37.0 g/dL 32.3    RDW-SD      35.1 - 46.3 fL 51.2 (H)    RDW      11.0 - 15.0 % 14.4    Platelet Count      188.9 - 450.0 10(3)uL 296.0    Prelim Neutrophil Abs      1.50 - 7.70 x10 (3) uL 3.97    Neutrophils Absolute      1.50 - 7.70 x10(3) uL 3.97    Lymphocytes Absolute      1.00 - 4.00 x10(3) uL 1.18    Monocytes Absolute      0.10 - 1.00 x10(3) uL 0.35    Eosinophils Absolute      0.00 - 0.70 x10(3) uL 0.03    Basophils Absolute      0.00 - 0.20 x10(3) uL 0.03    Immature Granulocyte Absolute      0.00 - 1.00 x10(3) uL 0.02    Neutrophils %      % 71.2    Lymphocytes %      % 21.1    Monocytes %      % 6.3    Eosinophils %      % 0.5    Basophils %      % 0.5    Immature Granulocyte %      % 0.4    Quantiferon TB NIL      IU/mL 0.05    Quantiferon-TB1 Minus NIL      IU/mL 0.00    Quantiferon-TB2 Minus NIL      IU/mL 0.00    Quantiferon TB Mitogen minus NIL      IU/mL 8.93    Quantiferon TB Result      Negative  Negative    CREATININE      0.70 - 1.30 mg/dL 1.08    eGFR-Cr      >=60 mL/min/1.73m2 81    Albumin      3.4 - 5.0 g/dL 3.4    AST (SGOT)      15 - 37 U/L 14 (L)    C-REACTIVE PROTEIN      <0.30 mg/dL 0.73 (H)    SED RATE      0 - 20 mm/Hr 20       Legend:  (L) Low  (H) High

## 2023-08-01 RX ORDER — FOLIC ACID 1 MG/1
TABLET ORAL
Qty: 90 TABLET | Refills: 3 | Status: SHIPPED | OUTPATIENT
Start: 2023-08-01

## 2023-08-01 NOTE — TELEPHONE ENCOUNTER
LOV:  3/10/2023  No future appointments.   Labs:    Component      Latest Ref Rng 3/10/2023   WBC      4.0 - 11.0 x10(3) uL 5.6    RBC      4.30 - 5.70 x10(6)uL 4.28 (L)    Hemoglobin      13.0 - 17.5 g/dL 13.5    Hematocrit      39.0 - 53.0 % 41.8    MCV      80.0 - 100.0 fL 97.7    MCH      26.0 - 34.0 pg 31.5    MCHC      31.0 - 37.0 g/dL 32.3    RDW-SD      35.1 - 46.3 fL 51.2 (H)    RDW      11.0 - 15.0 % 14.4    Platelet Count      995.3 - 450.0 10(3)uL 296.0    Prelim Neutrophil Abs      1.50 - 7.70 x10 (3) uL 3.97    Neutrophils Absolute      1.50 - 7.70 x10(3) uL 3.97    Lymphocytes Absolute      1.00 - 4.00 x10(3) uL 1.18    Monocytes Absolute      0.10 - 1.00 x10(3) uL 0.35    Eosinophils Absolute      0.00 - 0.70 x10(3) uL 0.03    Basophils Absolute      0.00 - 0.20 x10(3) uL 0.03    Immature Granulocyte Absolute      0.00 - 1.00 x10(3) uL 0.02    Neutrophils %      % 71.2    Lymphocytes %      % 21.1    Monocytes %      % 6.3    Eosinophils %      % 0.5    Basophils %      % 0.5    Immature Granulocyte %      % 0.4    Quantiferon TB NIL      IU/mL 0.05    Quantiferon-TB1 Minus NIL      IU/mL 0.00    Quantiferon-TB2 Minus NIL      IU/mL 0.00    Quantiferon TB Mitogen minus NIL      IU/mL 8.93    Quantiferon TB Result      Negative  Negative    CREATININE      0.70 - 1.30 mg/dL 1.08    eGFR-Cr      >=60 mL/min/1.73m2 81    Albumin      3.4 - 5.0 g/dL 3.4    AST (SGOT)      15 - 37 U/L 14 (L)    C-REACTIVE PROTEIN      <0.30 mg/dL 0.73 (H)    SED RATE      0 - 20 mm/Hr 20       Legend:  (L) Low  (H) High